# Patient Record
Sex: FEMALE
[De-identification: names, ages, dates, MRNs, and addresses within clinical notes are randomized per-mention and may not be internally consistent; named-entity substitution may affect disease eponyms.]

---

## 2023-03-29 PROBLEM — Z00.00 ENCOUNTER FOR PREVENTIVE HEALTH EXAMINATION: Status: ACTIVE | Noted: 2023-03-29

## 2023-04-11 ENCOUNTER — NON-APPOINTMENT (OUTPATIENT)
Age: 23
End: 2023-04-11

## 2023-11-14 ENCOUNTER — APPOINTMENT (OUTPATIENT)
Dept: PULMONOLOGY | Facility: CLINIC | Age: 23
End: 2023-11-14
Payer: MEDICAID

## 2023-11-14 VITALS
WEIGHT: 165 LBS | HEIGHT: 65 IN | DIASTOLIC BLOOD PRESSURE: 89 MMHG | BODY MASS INDEX: 27.49 KG/M2 | HEART RATE: 90 BPM | RESPIRATION RATE: 17 BRPM | SYSTOLIC BLOOD PRESSURE: 125 MMHG | OXYGEN SATURATION: 99 %

## 2023-11-14 DIAGNOSIS — K59.00 CONSTIPATION, UNSPECIFIED: ICD-10-CM

## 2023-11-14 PROCEDURE — 99205 OFFICE O/P NEW HI 60 MIN: CPT

## 2023-11-27 ENCOUNTER — APPOINTMENT (OUTPATIENT)
Dept: PULMONOLOGY | Facility: CLINIC | Age: 23
End: 2023-11-27

## 2023-12-08 ENCOUNTER — NON-APPOINTMENT (OUTPATIENT)
Age: 23
End: 2023-12-08

## 2024-01-02 ENCOUNTER — APPOINTMENT (OUTPATIENT)
Dept: PULMONOLOGY | Facility: CLINIC | Age: 24
End: 2024-01-02
Payer: MEDICAID

## 2024-01-02 ENCOUNTER — APPOINTMENT (OUTPATIENT)
Dept: RADIOLOGY | Facility: IMAGING CENTER | Age: 24
End: 2024-01-02
Payer: MEDICAID

## 2024-01-02 ENCOUNTER — OUTPATIENT (OUTPATIENT)
Dept: OUTPATIENT SERVICES | Facility: HOSPITAL | Age: 24
LOS: 1 days | End: 2024-01-02
Payer: MEDICAID

## 2024-01-02 ENCOUNTER — APPOINTMENT (OUTPATIENT)
Dept: PEDIATRIC PULMONARY CYSTIC FIB | Facility: CLINIC | Age: 24
End: 2024-01-02

## 2024-01-02 DIAGNOSIS — Z14.1 CYSTIC FIBROSIS CARRIER: ICD-10-CM

## 2024-01-02 PROCEDURE — 94060 EVALUATION OF WHEEZING: CPT

## 2024-01-02 PROCEDURE — 94729 DIFFUSING CAPACITY: CPT

## 2024-01-02 PROCEDURE — 94726 PLETHYSMOGRAPHY LUNG VOLUMES: CPT

## 2024-01-02 PROCEDURE — 71046 X-RAY EXAM CHEST 2 VIEWS: CPT | Mod: 26

## 2024-01-02 PROCEDURE — 71046 X-RAY EXAM CHEST 2 VIEWS: CPT

## 2024-01-19 ENCOUNTER — NON-APPOINTMENT (OUTPATIENT)
Age: 24
End: 2024-01-19

## 2024-01-19 DIAGNOSIS — K86.89 OTHER SPECIFIED DISEASES OF PANCREAS: ICD-10-CM

## 2024-01-19 LAB — PANCREATIC ELASTASE, FECAL: 145 CD:794062645

## 2024-02-21 ENCOUNTER — APPOINTMENT (OUTPATIENT)
Dept: PEDIATRIC PULMONARY CYSTIC FIB | Facility: CLINIC | Age: 24
End: 2024-02-21

## 2024-02-27 ENCOUNTER — APPOINTMENT (OUTPATIENT)
Dept: GASTROENTEROLOGY | Facility: CLINIC | Age: 24
End: 2024-02-27
Payer: MEDICAID

## 2024-02-27 PROCEDURE — 99204 OFFICE O/P NEW MOD 45 MIN: CPT | Mod: 95

## 2024-04-24 ENCOUNTER — APPOINTMENT (OUTPATIENT)
Dept: PULMONOLOGY | Facility: CLINIC | Age: 24
End: 2024-04-24
Payer: MEDICAID

## 2024-04-24 DIAGNOSIS — Z14.1 CYSTIC FIBROSIS CARRIER: ICD-10-CM

## 2024-04-24 DIAGNOSIS — R09.82 POSTNASAL DRIP: ICD-10-CM

## 2024-04-24 PROCEDURE — 99215 OFFICE O/P EST HI 40 MIN: CPT

## 2024-04-24 RX ORDER — FLUTICASONE PROPIONATE 50 UG/1
50 SPRAY, METERED NASAL TWICE DAILY
Qty: 1 | Refills: 6 | Status: ACTIVE | COMMUNITY
Start: 2024-04-24 | End: 1900-01-01

## 2024-05-02 NOTE — ADDENDUM
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 Patient's PMHx is significant for intermittent sinusitis and pneumonia (3x, last PNA ~ 8 years ago), though never leading to hospitalization or on IVABX. Also with history of GI symptoms including constipation, bloating, abdominal pain and presumed gluten sensitivity. As per patient negative for celiac.    Presents today for f/u well visit. Patient completed PFT (Normal), CXR (clear lungs) and sweat test (NEGATIVE x 2). Fecal elastase demonstrated pancreatic insufficiency. Pt followed up with GI, low suspicion for true PI per Dr. Hodge. Pt has responded well to dietary changes thus far. Recommended f/u with GI should have any recurrence of symptoms and would determine if repeat fecal elastase testing is warranted. Start Flonase for PND. Rx sent to pharmacy.  Patient should /u on an annual basis or sooner if new or concerning symptoms arise.   Josee Givens MD, MSCR

## 2024-05-02 NOTE — ASSESSMENT
[FreeTextEntry1] : Patient is a 24 y/o female never smoker who presented to our clinic for evaluation of CF after finding out she was a carrier of [id number] and [id number] on genetic testing. 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 Patient's PMHx is significant for intermittent sinusitis and pneumonia (3x, last PNA ~ 8 years ago), though never leading to hospitalization or on IVABX. Also with history of GI symptoms including constipation, bloating, abdominal pain and presumed gluten sensitivity. As per patient negative for celiac.    Presents today for f/u well visit. Patient completed PFT (Normal), CXR (clear lungs) and sweat test (NEGATIVE x 2). Fecal elastase demonstrated pancreatic insufficiency. Pt followed up with GI, low suspicion for true PI per Dr. Hodge. Pt has responded well to dietary changes thus far.   PND - Start Flonase. Rx sent to pharmacy.   Given patient's genetics, we have recommended f/u on an annual basis or sooner if new or concerning symptoms arise.

## 2024-05-02 NOTE — END OF VISIT
[Time Spent: ___ minutes] : I have spent [unfilled] minutes of time on the encounter. [FreeTextEntry3] : Ms. Blumberger presents today as a CF rule out after finding she was a carrier for [id number] and [id number] genetic testing was performed.  Has had intermittent symptoms of sinusitis and pneumonia throughout her life that did not seem to be particularly out of the ordinary/with concern for increased frequency and never leading to hospitalization.  Does have some recurring GI symptoms related to bloating/abd pain and suffers with chronic constipation (has gluten sensitivity).  Has never felt limited from a respiratory standpoint related to shortness of breath.  She has been fairly active throughout her life.  Has 10 siblings none of which have had symptoms that would appear related to CF.  One sibling is undergoing workup for Irving Danlos syndrome.  She lives in [place] with her  but visits [place] ([place] frequently.  Discussed preliminary workup would entail CXR, PFTs, Sweat chloride testing, fecal elastase testing.  Will determine need for HRCT Chest going forward.  No urgency to complete testing but she does wish to continue to follow with our team and pursue recommended testing.  Discussed importance of further consultation with  (had initial appt to review results) to review likelihood of passing these variants to offspring (as she remains concerned about how future children may/may not be affected) but also discussed that penetrance may be variable, case in point her being a carrier for 2 CF variants without significant limitations in her health throughout her life.  Seemed to understand points being made but would benefit from further counseling and specific statistical information that  may be able to provide.  Can RTC in 3 months.

## 2024-05-02 NOTE — HISTORY OF PRESENT ILLNESS
[Pancreatic Insufficiency] : pancreatic insufficiency [Constipation] : constipation [Home] : at home, [unfilled] , at the time of the visit. [Medical Office: ([facility])___] : at the medical office located in  [Verbal consent obtained from patient] : the patient, [unfilled] [Pancreatitis] : no pancreatitis [Pancreatic Enzyme Supp.] : uses no pancreatic enzyme supplements [FreeTextEntry1] : Patient is a 24 y/o female never smoker who presented to our clinic for evaluation of CF after finding out she was a carrier of [id number] and [id number]. 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 Patient's PMHx is significant for intermittent sinus infection and pneumonia, though never leading to hospitalization. Also with history of GI symptoms including constipation, bloating, abdominal pain and presumed gluten sensitivity.   Patient presents today for follow up well visit. From a pulmonary perspective patient has no complaints of concerns. Infrequent cough with small amount of mucus daily; usually clear/whitish. +PND; mild. Denies sinus pain/pressure/congestion. Patient attributes throat/ mucus clearing to PND. Patient reports no interval illness since she was last seen. Reports good exercise tolerance. From a GI perspective, Crystal reports going completely gluten free. Also drinking more water and peppermint tea nightly. She also self-started a new laxative called [employer] Oxy which she feels works much better for her. She uses it ~ 2x/month.  Previously tried Colace but did not help. Patient reports 1-2 BM's daily. Denies abdominal pain, constipation, diarrhea. She has also been getting B12 shots on a weekly basis.   Family Hx: Patient has 10 siblings none of which have had symptoms that would appear related to CF.  One sibling is undergoing workup for Irving Danlos syndrome.   Social: Patient lives in [place] with her  but visits [place] ([place] frequently.

## 2024-06-03 ENCOUNTER — APPOINTMENT (OUTPATIENT)
Dept: GASTROENTEROLOGY | Facility: CLINIC | Age: 24
End: 2024-06-03